# Patient Record
Sex: MALE | Race: WHITE | NOT HISPANIC OR LATINO
[De-identification: names, ages, dates, MRNs, and addresses within clinical notes are randomized per-mention and may not be internally consistent; named-entity substitution may affect disease eponyms.]

---

## 2021-07-23 ENCOUNTER — APPOINTMENT (OUTPATIENT)
Dept: VASCULAR SURGERY | Facility: CLINIC | Age: 81
End: 2021-07-23
Payer: MEDICARE

## 2021-07-23 VITALS
HEART RATE: 49 BPM | BODY MASS INDEX: 29.02 KG/M2 | SYSTOLIC BLOOD PRESSURE: 152 MMHG | WEIGHT: 170 LBS | DIASTOLIC BLOOD PRESSURE: 85 MMHG | HEIGHT: 64 IN

## 2021-07-23 DIAGNOSIS — E78.5 HYPERLIPIDEMIA, UNSPECIFIED: ICD-10-CM

## 2021-07-23 DIAGNOSIS — Z86.39 PERSONAL HISTORY OF OTHER ENDOCRINE, NUTRITIONAL AND METABOLIC DISEASE: ICD-10-CM

## 2021-07-23 DIAGNOSIS — Z86.79 PERSONAL HISTORY OF OTHER DISEASES OF THE CIRCULATORY SYSTEM: ICD-10-CM

## 2021-07-23 DIAGNOSIS — Z85.038 PERSONAL HISTORY OF OTHER MALIGNANT NEOPLASM OF LARGE INTESTINE: ICD-10-CM

## 2021-07-23 DIAGNOSIS — Z87.891 PERSONAL HISTORY OF NICOTINE DEPENDENCE: ICD-10-CM

## 2021-07-23 DIAGNOSIS — I73.9 PERIPHERAL VASCULAR DISEASE, UNSPECIFIED: ICD-10-CM

## 2021-07-23 DIAGNOSIS — Z78.9 OTHER SPECIFIED HEALTH STATUS: ICD-10-CM

## 2021-07-23 PROCEDURE — 99203 OFFICE O/P NEW LOW 30 MIN: CPT | Mod: 25

## 2021-07-23 PROCEDURE — 93923 UPR/LXTR ART STDY 3+ LVLS: CPT

## 2021-07-24 PROBLEM — Z86.79 HISTORY OF HYPERTENSION: Status: RESOLVED | Noted: 2021-07-24 | Resolved: 2021-07-24

## 2021-07-24 PROBLEM — Z86.39 HISTORY OF TYPE 2 DIABETES MELLITUS: Status: RESOLVED | Noted: 2021-07-24 | Resolved: 2021-07-24

## 2021-07-24 PROBLEM — Z87.891 FORMER SMOKER: Status: ACTIVE | Noted: 2021-07-24

## 2021-07-24 PROBLEM — E78.5 DYSLIPIDEMIA: Status: RESOLVED | Noted: 2021-07-24 | Resolved: 2021-07-24

## 2021-07-24 PROBLEM — Z78.9 SOCIAL ALCOHOL USE: Status: ACTIVE | Noted: 2021-07-24

## 2021-07-24 PROBLEM — Z85.038 HISTORY OF MALIGNANT NEOPLASM OF COLON: Status: RESOLVED | Noted: 2021-07-24 | Resolved: 2021-07-24

## 2021-07-27 NOTE — PHYSICAL EXAM
[Normal Breath Sounds] : Normal breath sounds [Varicose Veins Of Lower Extremities] : bilaterally [Ankle Swelling On The Left] : moderate [Alert] : alert [Oriented to Person] : oriented to person [Oriented to Place] : oriented to place [Oriented to Time] : oriented to time [Calm] : calm [Normal Heart Sounds] : normal heart sounds [Normal Rate and Rhythm] : normal rate and rhythm [2+] : left 2+ [0] : right 0 [1+] : left 1+ [JVD] : no jugular venous distention  [de-identified] : Well appearing. NAD [de-identified] : NCAT [de-identified] : FROM

## 2021-07-27 NOTE — ADDENDUM
[FreeTextEntry1] : I, Dr.Vicken Arguello, personally performed the evaluation and management (E/M) services for this new patient.  That E/M includes conducting the initial examination, assessing all conditions, and establishing the plan of care.  Today, my ACP, DAVID Mendoza, was here to observe my evaluation and management services for this patient to be followed going forward.\par \par \par

## 2021-07-27 NOTE — ASSESSMENT
[Arterial/Venous Disease] : arterial/venous disease [FreeTextEntry1] : 79 y/o diabetic M former smoker with PMHx of HTN, HLD, CA and s/p who presents today for initial evaluation of pain in the bilateral legs, R > L. NITHYA/PVR demonstrates R: 0.95 and L: 0.62. This suggests impaired circulation in the left leg and not the right, but patient states pain in the right leg is more severe so I do not believe his symptoms are caused by a circulation problem. I will order a CTA aorta w/run off and refer patient to spine specialist, Dr. White. Patient should follow up in 2 weeks for a carotid scan and CTA review.\par \par Plan\par -Schedule CTA\par -Refer to spine specialist, Dr. White\par -F/u 2 weeks for carotid scan and CTA review

## 2021-07-27 NOTE — HISTORY OF PRESENT ILLNESS
[FreeTextEntry1] : 81 y/o diabetic M, former smoker, with PMHx of HTN, HLD, CA and s/p who presents today for initial evaluation of pain in the bilateral legs. He was referred by Dr. Susan De Leon and his cardiologist is Dr. Alfaro. Patient reports pain in the calves, R > L, after two blocks of walking. He had a stent placed in the RLE in 2020 but reports it did not help his ability to walk long distances without pain. He also reports lower back pain that's been present for over 20 years. Pt states he had his carotid arteries checked ~3-4 years ago.\par \par Patient has a long surgical history including cataract surgery 2002, left shoulder surgery 2002, partial colon resection for colon cancer 2008, hernia repair 2009, trigger finger and carpal tunnel surgery 2011 and 2012, catheterization with LLE stent by Dr. Jaleel Ventura 2012, endovenous laser ablation right leg 2013, endovenous laser ablation left leg 2013, cardiac catheterization by Dr. Tristin Alfaro 2015, aortic valve replacement by Dr. Russ Moe 2015, stent implant in right leg by Dr. Tristin Alfaro 2017, arthroscopy in left knee 2018, trigger finger surgery 2018, pacemaker implantation by Dr. Gideon Piña 2020, cardiac catheterization 2020, stent implant in right leg and dilatation of right femoral artery by Dr. Alfaro 2020. Pt is on Coumadin.\par \par

## 2021-07-27 NOTE — END OF VISIT
[FreeTextEntry3] : This note was written by Pooja Carrasco on 07/23/2021 acting as scribe for Dr. Medina.

## 2021-07-29 ENCOUNTER — APPOINTMENT (OUTPATIENT)
Dept: CT IMAGING | Facility: CLINIC | Age: 81
End: 2021-07-29
Payer: MEDICARE

## 2021-07-29 ENCOUNTER — RESULT REVIEW (OUTPATIENT)
Age: 81
End: 2021-07-29

## 2021-07-29 ENCOUNTER — OUTPATIENT (OUTPATIENT)
Dept: OUTPATIENT SERVICES | Facility: HOSPITAL | Age: 81
LOS: 1 days | End: 2021-07-29

## 2021-07-29 PROCEDURE — 75635 CT ANGIO ABDOMINAL ARTERIES: CPT | Mod: 26,MH

## 2021-08-06 ENCOUNTER — APPOINTMENT (OUTPATIENT)
Dept: VASCULAR SURGERY | Facility: CLINIC | Age: 81
End: 2021-08-06

## 2021-08-16 DIAGNOSIS — Z01.818 ENCOUNTER FOR OTHER PREPROCEDURAL EXAMINATION: ICD-10-CM

## 2021-08-17 ENCOUNTER — RESULT REVIEW (OUTPATIENT)
Age: 81
End: 2021-08-17

## 2021-08-17 ENCOUNTER — APPOINTMENT (OUTPATIENT)
Dept: ORTHOPEDIC SURGERY | Facility: CLINIC | Age: 81
End: 2021-08-17
Payer: MEDICARE

## 2021-08-17 ENCOUNTER — OUTPATIENT (OUTPATIENT)
Dept: OUTPATIENT SERVICES | Facility: HOSPITAL | Age: 81
LOS: 1 days | End: 2021-08-17
Payer: MEDICARE

## 2021-08-17 PROCEDURE — 72082 X-RAY EXAM ENTIRE SPI 2/3 VW: CPT

## 2021-08-17 PROCEDURE — 72100 X-RAY EXAM L-S SPINE 2/3 VWS: CPT

## 2021-08-17 PROCEDURE — 72083 X-RAY EXAM ENTIRE SPI 4/5 VW: CPT

## 2021-08-17 PROCEDURE — 99204 OFFICE O/P NEW MOD 45 MIN: CPT

## 2021-08-17 PROCEDURE — 72084 X-RAY EXAM ENTIRE SPI 6/> VW: CPT | Mod: 26

## 2021-08-17 NOTE — HISTORY OF PRESENT ILLNESS
[de-identified] : Referred by Dr. Arguello\par \par Mr. STANLEY is a very pleasant 80 year old male who complains of bilateral calf pain for several years, atraumatic. On onset, would only get pain when ambulating about 10 blocks, now gets pain after ambulating 1/2 block. Pain resolves with rest. Also with mild/moderate chronic nonradiating low back pain. Calf pain is primary complaint. Per patient, \par \par The patient no history of previous spine surgery.\par \par The patient has no history of unexpected weight loss, no history of active cancer, no history bladder or bowel dysfunction, no night pain, no fevers or chills.\par \par The past medical history, surgical history, family history, allergies, medications, 10+ point review of systems, family history and social history were reviewed and non contributory.\par \par

## 2021-08-17 NOTE — DISCUSSION/SUMMARY
[de-identified] : Discussed my findings with the patient. Mr. Phelps has been suffering from bilateral calf pain with ambulating over the past several months. Now only able to ambulate 1/2 block before stopping. Has pacemaker, is MRI compatible. I am recommending an MRI lumbar without contrast, order given. Follow up with me after imaging is completed, sooner if there is an issue. All questions answered.

## 2021-08-17 NOTE — PHYSICAL EXAM
[de-identified] : General: patient is well developed, well nourished, in no acute \par distress, alert and oriented x 3. \par \par Mood and affect: normal\par \par Respiratory: no respiratory distress noted\par \par Skin: no scars over spine, skin intact, no erythema, increased warmth\par \par Alignment:The spine is well compensated in the coronal and sagittal plane.  \par \par Gait: The patient is able to toe walk and heel walk without difficulty.\par \par Palpation: no tenderness to palpation spine or paraspinal region\par \par Range of motion: Lumbar spine ROM is restricted\par \par Neurologic Exam:\par Motor: Manual Muscle testing in the lower extremities is 5 out of 5 in all muscle groups. There is no evidence of muscular atrophy in the lower extremities. Sensory: Sensation to light touch is grossly intact in the lower extremities\par \par Reflexes: DTR are present and symmetric throughout\par \par Hip Exam: No pain with internal or external rotation of hips bilaterally\par \par Special tests: Straight leg raise test negative.  Cross straight leg test negative.  ERIS test negative\par \par \par  [de-identified] : XR 8/17/21: multilevel degenerative changes with bridging osteophytes, no spondylolisthesis or dynamic instability, no scoliosis, no fractures seen

## 2021-08-23 ENCOUNTER — OUTPATIENT (OUTPATIENT)
Dept: OUTPATIENT SERVICES | Facility: HOSPITAL | Age: 81
LOS: 1 days | End: 2021-08-23
Payer: MEDICARE

## 2021-08-23 DIAGNOSIS — Z01.818 ENCOUNTER FOR OTHER PREPROCEDURAL EXAMINATION: ICD-10-CM

## 2021-08-23 LAB
ALBUMIN SERPL ELPH-MCNC: 4.6 G/DL — SIGNIFICANT CHANGE UP (ref 3.3–5)
ALP SERPL-CCNC: 78 U/L — SIGNIFICANT CHANGE UP (ref 40–120)
ALT FLD-CCNC: 25 U/L — SIGNIFICANT CHANGE UP (ref 10–45)
ANION GAP SERPL CALC-SCNC: 10 MMOL/L — SIGNIFICANT CHANGE UP (ref 5–17)
AST SERPL-CCNC: 26 U/L — SIGNIFICANT CHANGE UP (ref 10–40)
BASOPHILS # BLD AUTO: 0.09 K/UL — SIGNIFICANT CHANGE UP (ref 0–0.2)
BASOPHILS NFR BLD AUTO: 0.8 % — SIGNIFICANT CHANGE UP (ref 0–2)
BILIRUB SERPL-MCNC: 0.4 MG/DL — SIGNIFICANT CHANGE UP (ref 0.2–1.2)
BUN SERPL-MCNC: 14 MG/DL — SIGNIFICANT CHANGE UP (ref 7–23)
CALCIUM SERPL-MCNC: 9.5 MG/DL — SIGNIFICANT CHANGE UP (ref 8.4–10.5)
CHLORIDE SERPL-SCNC: 100 MMOL/L — SIGNIFICANT CHANGE UP (ref 96–108)
CO2 SERPL-SCNC: 30 MMOL/L — SIGNIFICANT CHANGE UP (ref 22–31)
CREAT SERPL-MCNC: 0.91 MG/DL — SIGNIFICANT CHANGE UP (ref 0.5–1.3)
EOSINOPHIL # BLD AUTO: 0.11 K/UL — SIGNIFICANT CHANGE UP (ref 0–0.5)
EOSINOPHIL NFR BLD AUTO: 1 % — SIGNIFICANT CHANGE UP (ref 0–6)
GLUCOSE SERPL-MCNC: 106 MG/DL — HIGH (ref 70–99)
HCT VFR BLD CALC: 46.8 % — SIGNIFICANT CHANGE UP (ref 39–50)
HGB BLD-MCNC: 14.5 G/DL — SIGNIFICANT CHANGE UP (ref 13–17)
IMM GRANULOCYTES NFR BLD AUTO: 0.6 % — SIGNIFICANT CHANGE UP (ref 0–1.5)
INR BLD: 1.04 — SIGNIFICANT CHANGE UP (ref 0.88–1.16)
LYMPHOCYTES # BLD AUTO: 2.31 K/UL — SIGNIFICANT CHANGE UP (ref 1–3.3)
LYMPHOCYTES # BLD AUTO: 21.1 % — SIGNIFICANT CHANGE UP (ref 13–44)
MCHC RBC-ENTMCNC: 27.3 PG — SIGNIFICANT CHANGE UP (ref 27–34)
MCHC RBC-ENTMCNC: 31 GM/DL — LOW (ref 32–36)
MCV RBC AUTO: 88 FL — SIGNIFICANT CHANGE UP (ref 80–100)
MONOCYTES # BLD AUTO: 0.82 K/UL — SIGNIFICANT CHANGE UP (ref 0–0.9)
MONOCYTES NFR BLD AUTO: 7.5 % — SIGNIFICANT CHANGE UP (ref 2–14)
NEUTROPHILS # BLD AUTO: 7.56 K/UL — HIGH (ref 1.8–7.4)
NEUTROPHILS NFR BLD AUTO: 69 % — SIGNIFICANT CHANGE UP (ref 43–77)
NRBC # BLD: 0 /100 WBCS — SIGNIFICANT CHANGE UP (ref 0–0)
PLATELET # BLD AUTO: 192 K/UL — SIGNIFICANT CHANGE UP (ref 150–400)
POTASSIUM SERPL-MCNC: 5.5 MMOL/L — HIGH (ref 3.5–5.3)
POTASSIUM SERPL-SCNC: 5.5 MMOL/L — HIGH (ref 3.5–5.3)
PROT SERPL-MCNC: 6.8 G/DL — SIGNIFICANT CHANGE UP (ref 6–8.3)
PROTHROM AB SERPL-ACNC: 12.5 SEC — SIGNIFICANT CHANGE UP (ref 10.6–13.6)
RBC # BLD: 5.32 M/UL — SIGNIFICANT CHANGE UP (ref 4.2–5.8)
RBC # FLD: 13.8 % — SIGNIFICANT CHANGE UP (ref 10.3–14.5)
SODIUM SERPL-SCNC: 140 MMOL/L — SIGNIFICANT CHANGE UP (ref 135–145)
WBC # BLD: 10.96 K/UL — HIGH (ref 3.8–10.5)
WBC # FLD AUTO: 10.96 K/UL — HIGH (ref 3.8–10.5)

## 2021-08-23 PROCEDURE — 85610 PROTHROMBIN TIME: CPT

## 2021-08-23 PROCEDURE — 93010 ELECTROCARDIOGRAM REPORT: CPT

## 2021-08-23 PROCEDURE — 85025 COMPLETE CBC W/AUTO DIFF WBC: CPT

## 2021-08-23 PROCEDURE — 80053 COMPREHEN METABOLIC PANEL: CPT

## 2021-08-23 PROCEDURE — 93005 ELECTROCARDIOGRAM TRACING: CPT

## 2021-08-25 ENCOUNTER — TRANSCRIPTION ENCOUNTER (OUTPATIENT)
Age: 81
End: 2021-08-25

## 2021-08-25 VITALS
WEIGHT: 177.25 LBS | DIASTOLIC BLOOD PRESSURE: 68 MMHG | RESPIRATION RATE: 16 BRPM | OXYGEN SATURATION: 97 % | TEMPERATURE: 98 F | SYSTOLIC BLOOD PRESSURE: 176 MMHG | HEART RATE: 60 BPM

## 2021-08-25 RX ORDER — FUROSEMIDE 40 MG
1 TABLET ORAL
Qty: 0 | Refills: 0 | DISCHARGE

## 2021-08-25 RX ORDER — POTASSIUM CHLORIDE 20 MEQ
0 PACKET (EA) ORAL
Qty: 0 | Refills: 0 | DISCHARGE

## 2021-08-25 NOTE — ASU PATIENT PROFILE, ADULT - NSICDXPASTMEDICALHX_GEN_ALL_CORE_FT
PAST MEDICAL HISTORY:  Abdominal hernia Hernia    Cataract Cataract    Essential hypertension Hypertension    Hyperlipidemia Dyslipidemia    Malignant neoplasm of colon Colon cancer    Peripheral vascular disease s/p LLE stent    Type 2 diabetes mellitus Diabetes    Varicose veins without complication Varicose veins

## 2021-08-25 NOTE — ASU PATIENT PROFILE, ADULT - NSICDXPASTSURGICALHX_GEN_ALL_CORE_FT
PAST SURGICAL HISTORY:  Abdominal hernia Hernia    Acquired trigger finger Trigger finger of both hands    Carpal tunnel syndrome Carpal tunnel syndrome, bilateral    Cataract Cataracts, bilateral    Other postprocedural status S/P colon resection    Varicose veins without complication Varicose veins

## 2021-08-26 ENCOUNTER — TRANSCRIPTION ENCOUNTER (OUTPATIENT)
Age: 81
End: 2021-08-26

## 2021-08-26 ENCOUNTER — APPOINTMENT (OUTPATIENT)
Dept: VASCULAR SURGERY | Facility: HOSPITAL | Age: 81
End: 2021-08-26

## 2021-08-26 ENCOUNTER — OUTPATIENT (OUTPATIENT)
Dept: OUTPATIENT SERVICES | Facility: HOSPITAL | Age: 81
LOS: 1 days | Discharge: HOME | End: 2021-08-26
Payer: MEDICARE

## 2021-08-26 VITALS — OXYGEN SATURATION: 96 % | RESPIRATION RATE: 18 BRPM | HEART RATE: 60 BPM

## 2021-08-26 DIAGNOSIS — I73.9 PERIPHERAL VASCULAR DISEASE, UNSPECIFIED: ICD-10-CM

## 2021-08-26 LAB
GLUCOSE BLDC GLUCOMTR-MCNC: 101 MG/DL — HIGH (ref 70–99)
GLUCOSE BLDC GLUCOMTR-MCNC: 145 MG/DL — HIGH (ref 70–99)

## 2021-08-26 PROCEDURE — 37226: CPT | Mod: RT

## 2021-08-26 PROCEDURE — C1769: CPT

## 2021-08-26 PROCEDURE — 37226: CPT | Mod: GC

## 2021-08-26 PROCEDURE — C1887: CPT

## 2021-08-26 PROCEDURE — C1725: CPT

## 2021-08-26 PROCEDURE — C1894: CPT

## 2021-08-26 PROCEDURE — 82962 GLUCOSE BLOOD TEST: CPT

## 2021-08-26 PROCEDURE — C1874: CPT

## 2021-08-26 RX ORDER — METOPROLOL TARTRATE 50 MG
5 TABLET ORAL ONCE
Refills: 0 | Status: COMPLETED | OUTPATIENT
Start: 2021-08-26 | End: 2021-08-26

## 2021-08-26 RX ORDER — SODIUM CHLORIDE 9 MG/ML
0 INJECTION INTRAMUSCULAR; INTRAVENOUS; SUBCUTANEOUS
Qty: 0 | Refills: 0 | DISCHARGE
Start: 2021-08-26

## 2021-08-26 RX ORDER — ASPIRIN/CALCIUM CARB/MAGNESIUM 324 MG
81 TABLET ORAL DAILY
Refills: 0 | Status: DISCONTINUED | OUTPATIENT
Start: 2021-08-26 | End: 2021-08-26

## 2021-08-26 RX ORDER — HYDRALAZINE HCL 50 MG
5 TABLET ORAL ONCE
Refills: 0 | Status: COMPLETED | OUTPATIENT
Start: 2021-08-26 | End: 2021-08-26

## 2021-08-26 RX ORDER — SODIUM CHLORIDE 9 MG/ML
1000 INJECTION INTRAMUSCULAR; INTRAVENOUS; SUBCUTANEOUS
Refills: 0 | Status: DISCONTINUED | OUTPATIENT
Start: 2021-08-26 | End: 2021-08-26

## 2021-08-26 RX ADMIN — Medication 5 MILLIGRAM(S): at 15:53

## 2021-08-26 RX ADMIN — SODIUM CHLORIDE 60 MILLILITER(S): 9 INJECTION INTRAMUSCULAR; INTRAVENOUS; SUBCUTANEOUS at 14:27

## 2021-08-26 RX ADMIN — Medication 5 MILLIGRAM(S): at 15:37

## 2021-08-26 RX ADMIN — Medication 81 MILLIGRAM(S): at 14:26

## 2021-08-26 NOTE — DISCHARGE NOTE NURSING/CASE MANAGEMENT/SOCIAL WORK - NSDPLANG ASIS_GEN_ALL_CORE
Patient does not need to use any Advair since he is taking Symbicort. Please also continue Spiriva.  
Patient uses [facility] pharmacy . Hydrocodone with acetaminophen   
No

## 2021-08-26 NOTE — DISCHARGE NOTE NURSING/CASE MANAGEMENT/SOCIAL WORK - PATIENT PORTAL LINK FT
You can access the FollowMyHealth Patient Portal offered by [facility] by registering at the following website: [email or web address]. By joining [facility]’s FollowMyHealth portal, you will also be able to view your health information using other applications (apps) compatible with our system.

## 2021-08-26 NOTE — DISCHARGE NOTE PROVIDER - HOSPITAL COURSE
80y male admitted for elective RLE angiogram - significant stenosis found in proximal SFA and instent restenosis - stenoses treated with Zilver PTX 7x60mm stent.  this was done through left groin 6F access - hemostasis was achieved with 20 minutes of manual pressure. Patient received plavix in the PACU and he was discharged home after 4 hours of bedrest after the sheath was removed.

## 2021-08-26 NOTE — DISCHARGE NOTE PROVIDER - NSDCMRMEDTOKEN_GEN_ALL_CORE_FT
aspirin 81 mg oral tablet:   Lipitor 80 mg oral tablet: 1 tab(s) orally once a day  losartan 100 mg oral tablet: 1 tab(s) orally once a day  metFORMIN 1000 mg oral tablet: 1 tab(s) orally 2 times a day  metoprolol tartrate 50 mg oral tablet: 1 tab(s) orally 2 times a day  Plavix 75 mg oral tablet: 1 tab(s) orally once a day  rosuvastatin 10 mg oral tablet: 1 tab(s) orally once a day  Vitamin B12 1000 mcg oral tablet: 1 tab(s) orally once a day   aspirin 81 mg oral tablet:   Lipitor 80 mg oral tablet: 1 tab(s) orally once a day  losartan 100 mg oral tablet: 1 tab(s) orally once a day  metFORMIN 1000 mg oral tablet: 1 tab(s) orally 2 times a day  metoprolol tartrate 50 mg oral tablet: 1 tab(s) orally 2 times a day  Plavix 75 mg oral tablet: 1 tab(s) orally once a day  rosuvastatin 10 mg oral tablet: 1 tab(s) orally once a day  sodium chloride 0.9% injectable solution:  injectable   Vitamin B12 1000 mcg oral tablet: 1 tab(s) orally once a day

## 2021-08-26 NOTE — DISCHARGE NOTE PROVIDER - NSDCFUSCHEDAPPT_GEN_ALL_CORE_FT
ANNE STANLEY ; 08/31/2021 ; [facility] PreAdmits  ANNE STANLEY ; 08/31/2021 ; [facility] Rad MRI  [address]   ANNE STANLEY ; 09/10/2021 ; [facility] Surg Vasc [address]  ANNE STANLEY ; 09/14/2021 ; [facility] OrthoSurg [address]

## 2021-08-26 NOTE — DISCHARGE NOTE PROVIDER - NSDCACTIVITY_GEN_ALL_CORE
Return to Work/School allowed/Do not drive or operate machinery/Do not make important decisions/No heavy lifting/straining

## 2021-08-26 NOTE — DISCHARGE NOTE PROVIDER - NSDCCPCAREPLAN_GEN_ALL_CORE_FT
PRINCIPAL DISCHARGE DIAGNOSIS  Diagnosis: PVD (peripheral vascular disease)  Assessment and Plan of Treatment:       SECONDARY DISCHARGE DIAGNOSES  Diagnosis: PVD (peripheral vascular disease)  Assessment and Plan of Treatment:

## 2021-08-26 NOTE — PROGRESS NOTE ADULT - SUBJECTIVE AND OBJECTIVE BOX
Vascular Surgery Post-Op Note    Procedure:    Diagnosis/Indication: Claudication    Surgeon: Dr. Arguello    S: Pt has no complaints. Denies CP, SOB, CALABRESE, calf tenderness. Pain controlled with medication.    O:  T(C): --  T(F): --  HR: 59 (08-26-21 @ 16:25) (57 - 61)  BP: 164/72 (08-26-21 @ 16:25) (163/77 - 209/92)  RR: 17 (08-26-21 @ 16:25) (13 - 28)  SpO2: 97% (08-26-21 @ 16:25) (95% - 100%)  Wt(kg): --            Gen: NAD, resting comfortably in bed  C/V: NSR  Pulm: Nonlabored breathing, no respiratory distress  Abd: soft, NT/ND  Extrem: WWP, no calf edema, SCDs in place      A/P: 80yMale s/p above procedure  Diet:  IVF:  Pain/nausea control  DVT ppx:  Dispo plan: Vascular Surgery Post-Op Note    Procedure: RLE angiogram and SFA stent    Diagnosis/Indication: Claudication    Surgeon: Dr. Arguello    S: Pt has no complaints. Pain controlled with medication.    O:  T(C): --  T(F): --  HR: 59 (08-26-21 @ 16:25) (57 - 61)  BP: 164/72 (08-26-21 @ 16:25) (163/77 - 209/92)  RR: 17 (08-26-21 @ 16:25) (13 - 28)  SpO2: 97% (08-26-21 @ 16:25) (95% - 100%)  Wt(kg): --            Gen: NAD, resting comfortably in bed  C/V: NSR  Pulm: Nonlabored breathing, no respiratory distress  Abd: soft, NT/ND  Extrem: WWP, no calf edema, SCDs in place      A/P: 80y Male s/p above procedure  Diet: regular diet as tolerated   IVF: NS @  Pain/nausea control  DVT ppx:  Dispo plan: Vascular Surgery Post-Op Note    Procedure: RLE angiogram and SFA stent    Diagnosis/Indication: Claudication    Surgeon: Dr. Arguello    S: Pt has no complaints. Pain controlled with medication.    O:  T(C): --  T(F): --  HR: 59 (08-26-21 @ 16:25) (57 - 61)  BP: 164/72 (08-26-21 @ 16:25) (163/77 - 209/92)  RR: 17 (08-26-21 @ 16:25) (13 - 28)  SpO2: 97% (08-26-21 @ 16:25) (95% - 100%)  Wt(kg): --          Physical exam  Gen: NAD, resting comfortably in bed  C/V: NSR  Pulm: Nonlabored breathing, no respiratory distress  Abd: soft, NT/ND  Extrem: 6 [place] sheath from left groin pulled and hemostasis achieved with 20 minutes of manual pressure, left groin soft; palpable DP on right, R biphasic PT      A/P: 80y Male s/p above procedure  Diet: regular diet as tolerated   IVF: NS @ 60 ml/hr  Dispo plan: Home after 4 hrs of being flat after sheath pull

## 2021-08-26 NOTE — BRIEF OPERATIVE NOTE - OPERATION/FINDINGS
Procedure: RLE angiogram via left groin 6F sheath access. SFA instent restenosis and significant stenosis noted at proximal SFA with AT runoff to the foot. Stenosis treated with Zilver 7x60mm.   - palpable DP at conclusion of the case.   Indication: claudication   IV Contrast: 45cc

## 2021-08-26 NOTE — DISCHARGE NOTE PROVIDER - CARE PROVIDER_API CALL
Deann Arguello)  Surgery; Vascular Surgery  [address] [place] [address]  Phone: [phone]  Fax: [phone]  Follow Up Time:

## 2021-08-27 NOTE — PACU DISCHARGE NOTE - COMMENTS
Addendum:  Voice message left for pt regarding printed out discharge paperwork.  documents emailed 8/27/21 as per pt's request on 8/26/21. Spoke with pt's sister Kayla  this am regarding same. As per Kayla neither her or pt  will be able to come to hospital today to retrieve hard copy and sign receipt/pt acknowledgment. no other concerned voiced by family
pt cleared by Vascular and Anesthesia for discharge home. Unable to print-out instructions. verbal post- care instructions given including calling to make follow-up appointment. pt informed that RN will attempt to print instructions on 8/27/21, same pt agreed to have care-giver retrieve documents or if possible email them to [email or web address].

## 2021-08-31 ENCOUNTER — OUTPATIENT (OUTPATIENT)
Dept: OUTPATIENT SERVICES | Facility: HOSPITAL | Age: 81
LOS: 1 days | End: 2021-08-31
Payer: MEDICARE

## 2021-08-31 ENCOUNTER — APPOINTMENT (OUTPATIENT)
Dept: MRI IMAGING | Facility: HOSPITAL | Age: 81
End: 2021-08-31

## 2021-08-31 PROCEDURE — 76000 FLUOROSCOPY <1 HR PHYS/QHP: CPT

## 2021-08-31 PROCEDURE — 72148 MRI LUMBAR SPINE W/O DYE: CPT | Mod: 26,MH

## 2021-08-31 PROCEDURE — 72148 MRI LUMBAR SPINE W/O DYE: CPT

## 2021-09-10 ENCOUNTER — APPOINTMENT (OUTPATIENT)
Dept: VASCULAR SURGERY | Facility: CLINIC | Age: 81
End: 2021-09-10
Payer: MEDICARE

## 2021-09-10 VITALS — HEART RATE: 60 BPM | SYSTOLIC BLOOD PRESSURE: 182 MMHG | DIASTOLIC BLOOD PRESSURE: 75 MMHG

## 2021-09-10 PROCEDURE — 93926 LOWER EXTREMITY STUDY: CPT

## 2021-09-10 PROCEDURE — 99213 OFFICE O/P EST LOW 20 MIN: CPT

## 2021-09-10 NOTE — HISTORY OF PRESENT ILLNESS
[FreeTextEntry1] : 79 y/o diabetic M, former smoker, with PMHx of HTN, HLD, CA and s/p who presents today for evaluation of pain in the bilateral legs. He was referred by Dr. Susan De Leon and his cardiologist is Dr. Alfaro. Patient reports pain in the calves, now L >R after two blocks of walking. He had a stent placed in the RLE in 2020 but reports it did not help his ability to walk long distances without pain. He also reports lower back pain that's been present for over 20 years. Pt states he had his carotid arteries checked ~3-4 years ago.\par \par \par Off note, patient has a long surgical history including cataract surgery 2002, left shoulder surgery 2002, partial colon resection for colon cancer 2008, hernia repair 2009, trigger finger and carpal tunnel surgery 2011 and 2012, catheterization with LLE stent by Dr. Jaleel Ventura 2012, endovenous laser ablation right leg 2013, endovenous laser ablation left leg 2013, cardiac catheterization by Dr. Tristin Alfaro 2015, aortic valve replacement by Dr. Russ Moe 2015, stent implant in right leg by Dr. Tristin Alfaro 2017, arthroscopy in left knee 2018, trigger finger surgery 2018, pacemaker implantation by Dr. Gideon Piña 2020, cardiac catheterization 2020, stent implant in right leg and dilatation of right femoral artery by Dr. Alfaro 2020. Pt is on Coumadin.\par \par

## 2021-09-10 NOTE — ADDENDUM
[FreeTextEntry1] : This note was written by Celia James on 09/10/2021 acting as scribe for Deann Foy M.D.\par \par

## 2021-09-10 NOTE — ASSESSMENT
[Arterial/Venous Disease] : arterial/venous disease [Medication Management] : medication management [FreeTextEntry1] : 81 y/o diabetic M former smoker with PMHx of HTN, HLD, CA and s/p who presents today for initial evaluation of pain in the bilateral legs, R > L s/p RLE SFA stent presents with complaints of LLE 1-2 blocks claudication. RLE arterial studies reveal: diffuse fibrocalcific plaque noted in the femoropopliteal tibial arteries. Stents visualized in the SFA is patent. Given severity of LLE claudication pt recommended for LLE angiogram with possible angioplasty/stent. Patient consents and would like to move forward with discussed course of treatment.

## 2021-09-10 NOTE — PHYSICAL EXAM
[Normal Breath Sounds] : Normal breath sounds [Normal Heart Sounds] : normal heart sounds [Normal Rate and Rhythm] : normal rate and rhythm [2+] : left 2+ [0] : right 0 [1+] : left 1+ [Varicose Veins Of Lower Extremities] : present [Ankle Swelling On The Left] : moderate [Alert] : alert [Oriented to Person] : oriented to person [Oriented to Place] : oriented to place [Oriented to Time] : oriented to time [Calm] : calm [JVD] : no jugular venous distention  [de-identified] : Well appearing. NAD [de-identified] : NCAT [de-identified] : FROM

## 2021-09-14 ENCOUNTER — APPOINTMENT (OUTPATIENT)
Dept: ORTHOPEDIC SURGERY | Facility: CLINIC | Age: 81
End: 2021-09-14
Payer: MEDICARE

## 2021-09-14 DIAGNOSIS — M48.061 SPINAL STENOSIS, LUMBAR REGION WITHOUT NEUROGENIC CLAUDICATION: ICD-10-CM

## 2021-09-14 DIAGNOSIS — M79.604 PAIN IN RIGHT LEG: ICD-10-CM

## 2021-09-14 DIAGNOSIS — M79.605 PAIN IN RIGHT LEG: ICD-10-CM

## 2021-09-14 PROCEDURE — 99214 OFFICE O/P EST MOD 30 MIN: CPT

## 2021-09-14 RX ORDER — CLOPIDOGREL BISULFATE 75 MG/1
75 TABLET, FILM COATED ORAL
Qty: 90 | Refills: 0 | Status: ACTIVE | COMMUNITY
Start: 2021-06-01

## 2021-09-14 RX ORDER — EZETIMIBE 10 MG/1
10 TABLET ORAL
Qty: 90 | Refills: 0 | Status: ACTIVE | COMMUNITY
Start: 2021-07-30

## 2021-09-14 RX ORDER — METOPROLOL SUCCINATE 50 MG/1
50 TABLET, EXTENDED RELEASE ORAL
Qty: 90 | Refills: 0 | Status: ACTIVE | COMMUNITY
Start: 2021-06-01

## 2021-09-14 RX ORDER — ROSUVASTATIN CALCIUM 40 MG/1
40 TABLET, FILM COATED ORAL
Qty: 90 | Refills: 0 | Status: ACTIVE | COMMUNITY
Start: 2021-09-08

## 2021-09-17 NOTE — HISTORY OF PRESENT ILLNESS
[de-identified] : Follow up 9/14/21: Patient reports improvement in right calf pain s/p RLE angioplasty and stent of SFA 2 weeks ago with Dr. Arguello. Still with left calf pain, plan for LLE angiogram and possible angioplasty/stent. Denies new neurologic symptoms. Here to review updated imaging. \par \par Initial 8/2021: Mr. STANLEY is a very pleasant 80 year old male who complains of bilateral calf pain for several years, atraumatic. On onset, would only get pain when ambulating about 10 blocks, now gets pain after ambulating 1/2 block. Pain resolves with rest. Also with mild/moderate chronic nonradiating low back pain. Calf pain is primary complaint. Per patient, \par \par The patient no history of previous spine surgery.\par \par The patient has no history of unexpected weight loss, no history of active cancer, no history bladder or bowel dysfunction, no night pain, no fevers or chills.\par \par The past medical history, surgical history, family history, allergies, medications, 10+ point review of systems, family history and social history were reviewed and non contributory.\par \par

## 2021-09-17 NOTE — PHYSICAL EXAM
[de-identified] : General: patient is well developed, well nourished, in no acute \par distress, alert and oriented x 3. \par \par Mood and affect: normal\par \par Respiratory: no respiratory distress noted\par \par Skin: no scars over spine, skin intact, no erythema, increased warmth\par \par Alignment:The spine is well compensated in the coronal and sagittal plane.  \par \par Gait: The patient is able to toe walk and heel walk without difficulty.\par \par Palpation: no tenderness to palpation spine or paraspinal region\par \par Range of motion: Lumbar spine ROM is restricted\par \par Neurologic Exam:\par Motor: Manual Muscle testing in the lower extremities is 5 out of 5 in all muscle groups. There is no evidence of muscular atrophy in the lower extremities. Sensory: Sensation to light touch is grossly intact in the lower extremities\par \par Reflexes: DTR are present and symmetric throughout\par \par Hip Exam: No pain with internal or external rotation of hips bilaterally\par \par Special tests: Straight leg raise test negative.  Cross straight leg test negative.  ERIS test negative\par \par \par  [de-identified] : MRI lumbar 8/2021: multilevel disc bulges and posterior element hypertrophy; L4/L5 moderate bilateral foraminal stenosis

## 2021-09-17 NOTE — DISCUSSION/SUMMARY
[de-identified] : Discussed my findings with the patient. Patient reports improvement in right calf pain s/p R SFA angioplasty/stent with Dr. Arguello 2 weeks ago. As symptoms improved post-procedure, recommending he proceed with LLE angiogram as planned. He will follow up with me approximately 6-8 weeks s/p angiogram if still having pain, sooner if there is an issue. All questions answered.

## 2021-09-27 ENCOUNTER — TRANSCRIPTION ENCOUNTER (OUTPATIENT)
Age: 81
End: 2021-09-27

## 2021-09-27 VITALS
OXYGEN SATURATION: 96 % | HEART RATE: 63 BPM | RESPIRATION RATE: 16 BRPM | WEIGHT: 177.03 LBS | TEMPERATURE: 98 F | SYSTOLIC BLOOD PRESSURE: 178 MMHG | HEIGHT: 65 IN | DIASTOLIC BLOOD PRESSURE: 70 MMHG

## 2021-09-27 NOTE — ASU PATIENT PROFILE, ADULT - NSICDXPASTSURGICALHX_GEN_ALL_CORE_FT
PAST SURGICAL HISTORY:  Abdominal hernia Hernia    Acquired trigger finger Trigger finger of both hands    Carpal tunnel syndrome Carpal tunnel syndrome, bilateral    Cataract Cataracts, bilateral    Other postprocedural status S/P colon resection    Varicose veins without complication Varicose veins     PAST SURGICAL HISTORY:  Abdominal hernia Hernia    Acquired trigger finger Trigger finger of both hands    Carpal tunnel syndrome Carpal tunnel syndrome, bilateral    Cataract Cataracts, bilateral    Other postprocedural status S/P colon resection    S/P angiogram of extremity s/p RLE Angio/SFA PTA stent    Varicose veins without complication Varicose veins     PAST SURGICAL HISTORY:  Abdominal hernia Hernia    Acquired trigger finger Trigger finger of both hands    Cardiac pacemaker     Carpal tunnel syndrome Carpal tunnel syndrome, bilateral    Cataract Cataracts, bilateral    Other postprocedural status S/P colon resection    S/P angiogram of extremity s/p RLE Angio/SFA PTA stent    S/P TAVR (transcatheter aortic valve replacement) 6 years ago    Varicose veins without complication Varicose veins

## 2021-09-28 ENCOUNTER — APPOINTMENT (OUTPATIENT)
Dept: VASCULAR SURGERY | Facility: HOSPITAL | Age: 81
End: 2021-09-28

## 2021-09-28 ENCOUNTER — OUTPATIENT (OUTPATIENT)
Dept: INPATIENT UNIT | Facility: HOSPITAL | Age: 81
LOS: 1 days | Discharge: HOME | End: 2021-09-28
Payer: MEDICARE

## 2021-09-28 ENCOUNTER — TRANSCRIPTION ENCOUNTER (OUTPATIENT)
Age: 81
End: 2021-09-28

## 2021-09-28 VITALS
SYSTOLIC BLOOD PRESSURE: 173 MMHG | DIASTOLIC BLOOD PRESSURE: 79 MMHG | OXYGEN SATURATION: 98 % | HEART RATE: 73 BPM | RESPIRATION RATE: 15 BRPM

## 2021-09-28 DIAGNOSIS — Z95.0 PRESENCE OF CARDIAC PACEMAKER: Chronic | ICD-10-CM

## 2021-09-28 DIAGNOSIS — Z95.2 PRESENCE OF PROSTHETIC HEART VALVE: Chronic | ICD-10-CM

## 2021-09-28 DIAGNOSIS — Z98.890 OTHER SPECIFIED POSTPROCEDURAL STATES: Chronic | ICD-10-CM

## 2021-09-28 LAB
GLUCOSE BLDC GLUCOMTR-MCNC: 114 MG/DL — HIGH (ref 70–99)
GLUCOSE BLDC GLUCOMTR-MCNC: 131 MG/DL — HIGH (ref 70–99)

## 2021-09-28 PROCEDURE — 82962 GLUCOSE BLOOD TEST: CPT

## 2021-09-28 PROCEDURE — C1725: CPT

## 2021-09-28 PROCEDURE — C1769: CPT

## 2021-09-28 PROCEDURE — 37226: CPT | Mod: LT

## 2021-09-28 PROCEDURE — C1894: CPT

## 2021-09-28 PROCEDURE — C1887: CPT

## 2021-09-28 PROCEDURE — C1874: CPT

## 2021-09-28 PROCEDURE — 37226: CPT | Mod: GC

## 2021-09-28 PROCEDURE — 76000 FLUOROSCOPY <1 HR PHYS/QHP: CPT

## 2021-09-28 RX ORDER — EZETIMIBE 10 MG/1
1 TABLET ORAL
Qty: 0 | Refills: 0 | DISCHARGE

## 2021-09-28 RX ORDER — METOPROLOL TARTRATE 50 MG
1 TABLET ORAL
Qty: 0 | Refills: 0 | DISCHARGE

## 2021-09-28 RX ORDER — DEXTROSE 50 % IN WATER 50 %
25 SYRINGE (ML) INTRAVENOUS ONCE
Refills: 0 | Status: DISCONTINUED | OUTPATIENT
Start: 2021-09-28 | End: 2021-09-28

## 2021-09-28 RX ORDER — ASPIRIN/CALCIUM CARB/MAGNESIUM 324 MG
0 TABLET ORAL
Qty: 0 | Refills: 0 | DISCHARGE

## 2021-09-28 RX ORDER — AMLODIPINE BESYLATE 2.5 MG/1
1 TABLET ORAL
Qty: 0 | Refills: 0 | DISCHARGE

## 2021-09-28 RX ORDER — ASPIRIN/CALCIUM CARB/MAGNESIUM 324 MG
81 TABLET ORAL DAILY
Refills: 0 | Status: DISCONTINUED | OUTPATIENT
Start: 2021-09-28 | End: 2021-09-28

## 2021-09-28 RX ORDER — DEXTROSE 50 % IN WATER 50 %
15 SYRINGE (ML) INTRAVENOUS ONCE
Refills: 0 | Status: DISCONTINUED | OUTPATIENT
Start: 2021-09-28 | End: 2021-09-28

## 2021-09-28 RX ORDER — LOSARTAN POTASSIUM 100 MG/1
1 TABLET, FILM COATED ORAL
Qty: 0 | Refills: 0 | DISCHARGE

## 2021-09-28 RX ORDER — ATORVASTATIN CALCIUM 80 MG/1
1 TABLET, FILM COATED ORAL
Qty: 0 | Refills: 0 | DISCHARGE

## 2021-09-28 RX ORDER — CLOPIDOGREL BISULFATE 75 MG/1
1 TABLET, FILM COATED ORAL
Qty: 0 | Refills: 0 | DISCHARGE

## 2021-09-28 RX ORDER — PREGABALIN 225 MG/1
1 CAPSULE ORAL
Qty: 0 | Refills: 0 | DISCHARGE

## 2021-09-28 RX ORDER — SODIUM CHLORIDE 9 MG/ML
1000 INJECTION, SOLUTION INTRAVENOUS
Refills: 0 | Status: DISCONTINUED | OUTPATIENT
Start: 2021-09-28 | End: 2021-09-28

## 2021-09-28 RX ORDER — GLUCAGON INJECTION, SOLUTION 0.5 MG/.1ML
1 INJECTION, SOLUTION SUBCUTANEOUS ONCE
Refills: 0 | Status: DISCONTINUED | OUTPATIENT
Start: 2021-09-28 | End: 2021-09-28

## 2021-09-28 RX ORDER — ATORVASTATIN CALCIUM 80 MG/1
80 TABLET, FILM COATED ORAL AT BEDTIME
Refills: 0 | Status: DISCONTINUED | OUTPATIENT
Start: 2021-09-28 | End: 2021-09-28

## 2021-09-28 RX ORDER — CLOPIDOGREL BISULFATE 75 MG/1
75 TABLET, FILM COATED ORAL EVERY 24 HOURS
Refills: 0 | Status: DISCONTINUED | OUTPATIENT
Start: 2021-09-28 | End: 2021-09-28

## 2021-09-28 RX ORDER — DORZOLAMIDE HYDROCHLORIDE 20 MG/ML
1 SOLUTION/ DROPS OPHTHALMIC
Qty: 0 | Refills: 0 | DISCHARGE

## 2021-09-28 RX ORDER — INSULIN LISPRO 100/ML
VIAL (ML) SUBCUTANEOUS
Refills: 0 | Status: DISCONTINUED | OUTPATIENT
Start: 2021-09-28 | End: 2021-09-28

## 2021-09-28 RX ORDER — METFORMIN HYDROCHLORIDE 850 MG/1
1 TABLET ORAL
Qty: 0 | Refills: 0 | DISCHARGE

## 2021-09-28 RX ORDER — DEXTROSE 50 % IN WATER 50 %
12.5 SYRINGE (ML) INTRAVENOUS ONCE
Refills: 0 | Status: DISCONTINUED | OUTPATIENT
Start: 2021-09-28 | End: 2021-09-28

## 2021-09-28 RX ORDER — ROSUVASTATIN CALCIUM 5 MG/1
1 TABLET ORAL
Qty: 0 | Refills: 0 | DISCHARGE

## 2021-09-28 RX ORDER — TRAVOPROST 0.04 MG/ML
1 SOLUTION/ DROPS OPHTHALMIC
Qty: 0 | Refills: 0 | DISCHARGE

## 2021-09-28 RX ORDER — METOPROLOL TARTRATE 50 MG
50 TABLET ORAL EVERY 12 HOURS
Refills: 0 | Status: DISCONTINUED | OUTPATIENT
Start: 2021-09-28 | End: 2021-09-28

## 2021-09-28 RX ADMIN — Medication 81 MILLIGRAM(S): at 13:46

## 2021-09-28 RX ADMIN — CLOPIDOGREL BISULFATE 75 MILLIGRAM(S): 75 TABLET, FILM COATED ORAL at 13:46

## 2021-09-28 NOTE — DISCHARGE NOTE NURSING/CASE MANAGEMENT/SOCIAL WORK - NSDCPEFALRISK_GEN_ALL_CORE
For information on Fall & injury Prevention, visit [email or web address]

## 2021-09-28 NOTE — CHART NOTE - NSCHARTNOTEFT_GEN_A_CORE
s/p LLE angiogram and stenting. Post Op patient is resting comfortably. Denies abdominal pain, back pain. Denies right groin swelling or bleeding. Denies bilateral foot numbness or weakness.   -Right groin soft, no hematoma or ecchymosis noted   -ASA+Plavix given in PACU   -VSS stable   -bedrest x4 hrs   -d/c home with after bedrest

## 2021-09-28 NOTE — ASU DISCHARGE PLAN (ADULT/PEDIATRIC) - CARE PROVIDER_API CALL
Deann Arguello)  Surgery; Vascular Surgery  [address] [place] [address]  Phone: [phone]  Fax: [phone]  Follow Up Time:

## 2021-09-28 NOTE — PACU DISCHARGE NOTE - COMMENTS
s/p LLE angiogram and SFA stent replacement through Rt groin site dressed with pressure gauze and tegaderm. Rt groin site is clean and no hematoma. Both DPs are (+) doppler.  Vital Signs Stable. Pt is A&Ox4 and fully functional . Pt denies pain or discomfort at this time. No complaints nausea and vomiting. Discharge instruction reviewed with patient, patient verbalized and complete understand and escorted to lobby.

## 2021-09-28 NOTE — DISCHARGE NOTE NURSING/CASE MANAGEMENT/SOCIAL WORK - PATIENT PORTAL LINK FT
You can access the FollowMyHealth Patient Portal offered by [facility] by registering at the following website: [email or web address]. By joining [employer]’s FollowMyHealth portal, you will also be able to view your health information using other applications (apps) compatible with our system.

## 2021-10-04 ENCOUNTER — APPOINTMENT (OUTPATIENT)
Dept: VASCULAR SURGERY | Facility: CLINIC | Age: 81
End: 2021-10-04
Payer: MEDICARE

## 2021-10-04 VITALS
DIASTOLIC BLOOD PRESSURE: 77 MMHG | SYSTOLIC BLOOD PRESSURE: 155 MMHG | HEART RATE: 79 BPM | HEIGHT: 64 IN | BODY MASS INDEX: 29.02 KG/M2 | WEIGHT: 170 LBS

## 2021-10-04 PROCEDURE — 99213 OFFICE O/P EST LOW 20 MIN: CPT

## 2021-10-04 PROCEDURE — 93926 LOWER EXTREMITY STUDY: CPT

## 2021-10-08 NOTE — ASSESSMENT
[Arterial/Venous Disease] : arterial/venous disease [FreeTextEntry1] : 81 y/o diabetic M former smoker with PMHx of HTN, HLD, CA and s/p RLE Angio/SFA PTA/stent on 8/26/21 and now s/p LLE Angio/SFA stent on 9/28/21 returns for a f/u visit. Patient states that now pain in his legs is gone. \par LLE arterial US: Patent SFA stent, good flow.\par Patient was recommended to stay on Plavix and walk.\par F/u in 6 months or sooner if necessary.

## 2021-10-08 NOTE — REVIEW OF SYSTEMS
[Negative] : Heme/Lymph [Leg Claudication] : no intermittent leg claudication [Lower Ext Edema] : no extremity edema [Limb Pain] : no limb pain

## 2021-10-08 NOTE — ADDENDUM
[FreeTextEntry1] : I, Dr. Deann Arguello, personally performed the evaluation and management (E/M) services for this established patient who presents today with (an) existing condition(s).  That E/M includes conducting the examination, assessing all conditions, and (re)establishing/reinforcing a plan of care.  Today, my ACP, Roseann HERNANDEZ, was here to observe my evaluation and management services for this condition to be followed going forward.\par \par \par

## 2021-10-08 NOTE — PHYSICAL EXAM
[Normal Breath Sounds] : Normal breath sounds [Normal Heart Sounds] : normal heart sounds [Normal Rate and Rhythm] : normal rate and rhythm [1+] : left 1+ [Varicose Veins Of Lower Extremities] : bilaterally [Ankle Swelling On The Left] : moderate [Alert] : alert [Oriented to Person] : oriented to person [Oriented to Place] : oriented to place [Oriented to Time] : oriented to time [Calm] : calm [2+] : left 2+ [JVD] : no jugular venous distention  [de-identified] : Well appearing. NAD [de-identified] : NCAT [de-identified] : FROM

## 2021-10-08 NOTE — HISTORY OF PRESENT ILLNESS
[FreeTextEntry1] : 79 y/o diabetic M former smoker with PMHx of HTN, HLD, CA and s/p RLE Angio/SFA PTA/stent on 8/26/21 and now s/p LLE Angio/PTA on 9/28/21 returns for a f/u visit. He is happy with the outcome of L leg angiogram and now has no difficulty ambulating. Denies R groin pain, fever, chills\par

## 2021-12-23 LAB
SARS-COV-2 N GENE NPH QL NAA+PROBE: NOT DETECTED
SARS-COV-2 N GENE NPH QL NAA+PROBE: NOT DETECTED

## 2022-04-04 ENCOUNTER — APPOINTMENT (OUTPATIENT)
Dept: VASCULAR SURGERY | Facility: CLINIC | Age: 82
End: 2022-04-04
Payer: MEDICARE

## 2022-04-04 ENCOUNTER — APPOINTMENT (OUTPATIENT)
Dept: VASCULAR SURGERY | Facility: CLINIC | Age: 82
End: 2022-04-04

## 2022-04-04 VITALS
HEART RATE: 73 BPM | HEIGHT: 64 IN | DIASTOLIC BLOOD PRESSURE: 73 MMHG | WEIGHT: 170 LBS | BODY MASS INDEX: 29.02 KG/M2 | SYSTOLIC BLOOD PRESSURE: 169 MMHG

## 2022-04-04 PROCEDURE — 99213 OFFICE O/P EST LOW 20 MIN: CPT

## 2022-04-04 PROCEDURE — 93925 LOWER EXTREMITY STUDY: CPT

## 2022-04-07 NOTE — ADDENDUM
[FreeTextEntry1] : I, Dr. Malcolm Maddox, personally performed the evaluation and management (E/M) services for this established patient who presents today with (an) existing condition(s).  That E/M includes conducting the examination, assessing all conditions, and (re)establishing/reinforcing a plan of care.  Today, my ACP, Roseann HERNANDEZ, was here to observe my evaluation and management services for this condition to be followed going forward.\par \par \par

## 2022-04-07 NOTE — ASSESSMENT
[Arterial/Venous Disease] : arterial/venous disease [FreeTextEntry1] : 82 y/o diabetic M former smoker with PMHx of HTN, HLD, CA and s/p RLE Angio/SFA PTA/stent on 8/26/21 and now s/p LLE Angio/PTA on 9/28/21 returns for a f/u visit.\par BL LE arterial US: Patent bilateral SFA stents. \par Patient was recommended to stay active, c/w Plavix.\par F/u in 6 months or sooner if necessary.

## 2022-04-07 NOTE — PHYSICAL EXAM
[Normal Breath Sounds] : Normal breath sounds [Normal Heart Sounds] : normal heart sounds [Normal Rate and Rhythm] : normal rate and rhythm [2+] : left 2+ [1+] : left 1+ [Varicose Veins Of Lower Extremities] : bilaterally [Ankle Swelling On The Left] : moderate [Alert] : alert [Oriented to Person] : oriented to person [Oriented to Place] : oriented to place [Oriented to Time] : oriented to time [Calm] : calm [JVD] : no jugular venous distention  [de-identified] : Well appearing. NAD [de-identified] : NCAT [de-identified] : FROM

## 2022-04-07 NOTE — HISTORY OF PRESENT ILLNESS
[FreeTextEntry1] : 82 y/o diabetic M former smoker with PMHx of HTN, HLD, CA and s/p RLE Angio/SFA PTA/stent on 8/26/21 and now s/p LLE Angio/PTA on 9/28/21 returns for a f/u visit. He is doing well overall, no difficulty ambulating. Denies rest pain, fever, chills.\par

## 2022-09-23 ENCOUNTER — APPOINTMENT (OUTPATIENT)
Dept: VASCULAR SURGERY | Facility: CLINIC | Age: 82
End: 2022-09-23

## 2022-09-23 VITALS
BODY MASS INDEX: 29.02 KG/M2 | HEIGHT: 64 IN | DIASTOLIC BLOOD PRESSURE: 64 MMHG | WEIGHT: 170 LBS | SYSTOLIC BLOOD PRESSURE: 115 MMHG | HEART RATE: 58 BPM

## 2022-09-23 PROCEDURE — 93925 LOWER EXTREMITY STUDY: CPT

## 2022-09-23 PROCEDURE — 99213 OFFICE O/P EST LOW 20 MIN: CPT

## 2022-09-23 NOTE — REVIEW OF SYSTEMS
[Leg Claudication] : no intermittent leg claudication [Lower Ext Edema] : no extremity edema [Limb Pain] : no limb pain [Negative] : Heme/Lymph

## 2022-09-23 NOTE — HISTORY OF PRESENT ILLNESS
[FreeTextEntry1] : 80 y/o diabetic M former smoker with PMHx of HTN, HLD, CA and s/p RLE Angio/SFA PTA/stent on 8/26/21 and now s/p LLE Angio/PTA on 9/28/21 returns for a f/u visit. He is doing well overall, no difficulty ambulating. He is able to walk 10 blocks and then feels that his legs become heavy. Denies rest pain, fever, chills.\par

## 2022-09-23 NOTE — ASSESSMENT
[FreeTextEntry1] : 82 y/o diabetic M former smoker with PMHx of HTN, HLD, CA and s/p RLE Angio/SFA PTA/stent on 8/26/21 and now s/p LLE Angio/PTA on 9/28/21 returns for a f/u visit.\par Stable claudication, no rest pain, no skin changes.\par BL LE arterial US: Patent bilateral SFA stents. \par Patient was recommended to stay active, c/w Plavix.\par F/u in 6 months or sooner if necessary. [Arterial/Venous Disease] : arterial/venous disease

## 2022-09-23 NOTE — PHYSICAL EXAM
[JVD] : no jugular venous distention  [Normal Breath Sounds] : Normal breath sounds [Normal Heart Sounds] : normal heart sounds [Normal Rate and Rhythm] : normal rate and rhythm [2+] : left 2+ [1+] : left 1+ [Varicose Veins Of Lower Extremities] : bilaterally [Ankle Swelling On The Left] : moderate [Alert] : alert [Oriented to Person] : oriented to person [Oriented to Place] : oriented to place [Oriented to Time] : oriented to time [Calm] : calm [de-identified] : Well appearing. NAD [de-identified] : NCAT [de-identified] : FROM

## 2023-03-23 ENCOUNTER — APPOINTMENT (OUTPATIENT)
Dept: VASCULAR SURGERY | Facility: CLINIC | Age: 83
End: 2023-03-23
Payer: MEDICARE

## 2023-03-23 VITALS
DIASTOLIC BLOOD PRESSURE: 76 MMHG | SYSTOLIC BLOOD PRESSURE: 175 MMHG | HEART RATE: 67 BPM | HEIGHT: 64 IN | BODY MASS INDEX: 29.02 KG/M2 | WEIGHT: 170 LBS

## 2023-03-23 PROCEDURE — 93925 LOWER EXTREMITY STUDY: CPT

## 2023-03-23 PROCEDURE — 99213 OFFICE O/P EST LOW 20 MIN: CPT

## 2023-03-23 NOTE — PROCEDURE
[FreeTextEntry1] : BL LE arterial US: Patent bilateral SFA stents, single vessel run-off below knees bilaterally

## 2023-03-23 NOTE — ASSESSMENT
[Arterial/Venous Disease] : arterial/venous disease [FreeTextEntry1] : 81 y/o diabetic M former smoker with PMHx of HTN, HLD, CA and s/p RLE Angio/SFA PTA/stent on 8/26/21 and now s/p LLE Angio/PTA on 9/28/21 returns for a f/u visit.\par Stable claudication, no rest pain, no skin changes.\par BL LE arterial US: Patent bilateral SFA stents. \par Patient was recommended to stay active, c/w Plavix/ASA.\par F/u in 6 months or sooner if necessary.

## 2023-03-23 NOTE — PHYSICAL EXAM
[Normal Breath Sounds] : Normal breath sounds [Normal Heart Sounds] : normal heart sounds [Normal Rate and Rhythm] : normal rate and rhythm [2+] : left 2+ [1+] : left 1+ [Varicose Veins Of Lower Extremities] : bilaterally [Ankle Swelling On The Left] : moderate [Alert] : alert [Oriented to Person] : oriented to person [Oriented to Place] : oriented to place [Oriented to Time] : oriented to time [Calm] : calm [JVD] : no jugular venous distention  [de-identified] : Well appearing. NAD [de-identified] : NCAT [de-identified] : FROM

## 2023-03-23 NOTE — HISTORY OF PRESENT ILLNESS
[FreeTextEntry1] : 83 y/o diabetic M former smoker with PMHx of HTN, HLD, CA and s/p RLE Angio/SFA PTA/stent on 8/26/21 and s/p LLE Angio/PTA on 9/28/21 returns for a routine  f/u visit. He is doing well overall, no difficulty ambulating. He states that if he walks ~20 blocks, then he feels that his legs become heavy. Denies rest pain, skin breakdown. Patient recently had percutaneous heart valve procedure (TAVR?) since he has a history of rheumatic heart disease. \par

## 2023-09-06 NOTE — ASSESSMENT
[FreeTextEntry1] : 83 y/o diabetic M former smoker with PMHx of HTN, HLD, CA and s/p RLE Angio/SFA PTA/stent on 8/26/21 and now s/p LLE Angio/PTA on 9/28/21 returns for a f/u visit.\par  Stable claudication, no rest pain, no skin changes.\par  BL LE arterial US: Patent bilateral SFA stents. \par  Patient was recommended to stay active, c/w Plavix/ASA.\par  F/u in 6 months or sooner if necessary. [Arterial/Venous Disease] : arterial/venous disease

## 2023-09-06 NOTE — HISTORY OF PRESENT ILLNESS
[FreeTextEntry1] : 81 y/o diabetic M former smoker with PMHx of HTN, HLD, CA and s/p RLE Angio/SFA PTA/stent on 8/26/21 and s/p LLE Angio/PTA on 9/28/21 returns for a routine  f/u visit. He is doing well overall, no difficulty ambulating. He states that if he walks ~20 blocks, then he feels that his legs become heavy. Denies rest pain, skin breakdown. Patient recently had percutaneous heart valve procedure (TAVR?) since he has a history of rheumatic heart disease. \par

## 2023-09-06 NOTE — PHYSICAL EXAM
[JVD] : no jugular venous distention  [Normal Breath Sounds] : Normal breath sounds [Normal Heart Sounds] : normal heart sounds [Normal Rate and Rhythm] : normal rate and rhythm [2+] : left 2+ [1+] : left 1+ [Varicose Veins Of Lower Extremities] : bilaterally [Ankle Swelling On The Left] : moderate [Alert] : alert [Oriented to Person] : oriented to person [Oriented to Place] : oriented to place [Oriented to Time] : oriented to time [Calm] : calm [de-identified] : Well appearing. NAD [de-identified] : NCAT [de-identified] : FROM

## 2023-09-11 ENCOUNTER — APPOINTMENT (OUTPATIENT)
Dept: VASCULAR SURGERY | Facility: CLINIC | Age: 83
End: 2023-09-11
Payer: MEDICARE

## 2023-09-11 PROCEDURE — 99214 OFFICE O/P EST MOD 30 MIN: CPT

## 2023-09-11 PROCEDURE — 93925 LOWER EXTREMITY STUDY: CPT

## 2024-03-11 ENCOUNTER — APPOINTMENT (OUTPATIENT)
Dept: VASCULAR SURGERY | Facility: CLINIC | Age: 84
End: 2024-03-11
Payer: MEDICARE

## 2024-03-11 VITALS
HEIGHT: 64 IN | SYSTOLIC BLOOD PRESSURE: 161 MMHG | DIASTOLIC BLOOD PRESSURE: 62 MMHG | HEART RATE: 52 BPM | BODY MASS INDEX: 29.02 KG/M2 | WEIGHT: 170 LBS

## 2024-03-11 DIAGNOSIS — I73.9 PERIPHERAL VASCULAR DISEASE, UNSPECIFIED: ICD-10-CM

## 2024-03-11 PROCEDURE — 93925 LOWER EXTREMITY STUDY: CPT

## 2024-03-11 PROCEDURE — 99214 OFFICE O/P EST MOD 30 MIN: CPT

## 2024-03-12 PROBLEM — I73.9 PAD (PERIPHERAL ARTERY DISEASE): Status: ACTIVE | Noted: 2022-09-23

## 2024-03-13 NOTE — HISTORY OF PRESENT ILLNESS
[FreeTextEntry1] : 82yo diabetic M, former smoker with PMHx of HTN, HLD, CA and s/p RLE Angio/SFA PTA/stent on 8/26/21 and s/p LLE Angio/PTA on 9/28/21 returns for a routine f/u visit. He is doing well overall, no difficulty ambulating. He states that if he walks ~20 blocks, then he feels that his legs become heavy. Denies rest pain, skin breakdown. Patient is able to walk 6-7 blocks, denies rest pain, skin breakdown.

## 2024-03-13 NOTE — ASSESSMENT
[Arterial/Venous Disease] : arterial/venous disease [FreeTextEntry1] : 84yo diabetic M, former smoker with PMHx of HTN, HLD, CA and s/p RLE Angio/SFA PTA/stent on 8/26/21 and s/p LLE Angio/PTA on 9/28/21 returns for a routine f/u visit. He is doing well overall, no difficulty ambulating.  On exam, both legs are well perfused, no edema, no skin changes. BL LE arterial US: Patent bilateral SFA stents, single vessel run-off below knees bilaterally. We discussed the findings and explained that no vascular intervention is necessary at this time. He was recommended to stay active and to c/w his medications. F/u in 6 months for surveillance US or sooner if new symptoms.

## 2024-03-13 NOTE — PHYSICAL EXAM
[Normal Breath Sounds] : Normal breath sounds [Normal Heart Sounds] : normal heart sounds [Normal Rate and Rhythm] : normal rate and rhythm [2+] : left 2+ [1+] : left 1+ [Varicose Veins Of Lower Extremities] : present [Ankle Swelling On The Left] : moderate [Alert] : alert [Oriented to Place] : oriented to place [Oriented to Person] : oriented to person [Calm] : calm [Oriented to Time] : oriented to time [JVD] : no jugular venous distention  [de-identified] : Well appearing. NAD [de-identified] : FROM [de-identified] : NCAT [de-identified] : grossly intact

## 2024-03-13 NOTE — ADDENDUM
[FreeTextEntry1] : I, Dr. Pete Graves, personally performed the evaluation and management (E/M) services for this established patient who presents today with (an) existing condition(s).  That E/M includes conducting the examination, assessing all conditions, and (re)establishing/reinforcing a plan of care.  Today, my ACP, Roseann HERNANDEZ, was here to observe my evaluation and management services for this condition to be followed going forward.  I spent a total of 32 minutes in this encounter.

## 2024-07-09 ENCOUNTER — APPOINTMENT (OUTPATIENT)
Dept: ORTHOPEDIC SURGERY | Facility: CLINIC | Age: 84
End: 2024-07-09

## 2024-07-09 PROCEDURE — 99214 OFFICE O/P EST MOD 30 MIN: CPT

## 2024-07-09 PROCEDURE — 72110 X-RAY EXAM L-2 SPINE 4/>VWS: CPT

## 2024-07-09 RX ORDER — METHOCARBAMOL 500 MG/1
500 TABLET, FILM COATED ORAL 3 TIMES DAILY
Qty: 30 | Refills: 0 | Status: ACTIVE | COMMUNITY
Start: 2024-07-09 | End: 1900-01-01

## 2024-07-25 ENCOUNTER — APPOINTMENT (OUTPATIENT)
Dept: HEART AND VASCULAR | Facility: CLINIC | Age: 84
End: 2024-07-25
Payer: MEDICARE

## 2024-07-25 ENCOUNTER — APPOINTMENT (OUTPATIENT)
Dept: MRI IMAGING | Facility: HOSPITAL | Age: 84
End: 2024-07-25
Payer: MEDICARE

## 2024-07-25 ENCOUNTER — NON-APPOINTMENT (OUTPATIENT)
Age: 84
End: 2024-07-25

## 2024-07-25 VITALS
BODY MASS INDEX: 31.07 KG/M2 | HEART RATE: 54 BPM | DIASTOLIC BLOOD PRESSURE: 70 MMHG | TEMPERATURE: 96.6 F | WEIGHT: 182 LBS | HEIGHT: 64 IN | SYSTOLIC BLOOD PRESSURE: 168 MMHG | OXYGEN SATURATION: 95 %

## 2024-07-25 DIAGNOSIS — Z95.0 PRESENCE OF CARDIAC PACEMAKER: ICD-10-CM

## 2024-07-25 PROCEDURE — 72148 MRI LUMBAR SPINE W/O DYE: CPT | Mod: 26,MH

## 2024-07-25 PROCEDURE — 93280 PM DEVICE PROGR EVAL DUAL: CPT

## 2024-07-25 NOTE — PROCEDURE
[Complete Heart Block] : complete heart block [Pacemaker] : pacemaker [de-identified] : See paceart

## 2024-07-25 NOTE — PHYSICAL EXAM
[Normal Breath Sounds] : Normal breath sounds [Normal Heart Sounds] : normal heart sounds [Normal Rate and Rhythm] : normal rate and rhythm [2+] : left 2+ [1+] : left 1+ [Varicose Veins Of Lower Extremities] : bilaterally [Ankle Swelling On The Left] : moderate [Alert] : alert [Oriented to Person] : oriented to person [Oriented to Place] : oriented to place [Oriented to Time] : oriented to time [Calm] : calm [JVD] : no jugular venous distention  [de-identified] : Well appearing. NAD [de-identified] : NCAT [de-identified] : FROM [de-identified] : grossly intact [General Appearance - Well Developed] : well developed [Normal Appearance] : normal appearance [Well Groomed] : well groomed [General Appearance - Well Nourished] : well nourished [No Deformities] : no deformities [General Appearance - In No Acute Distress] : no acute distress [Left Infraclavicular] : left infraclavicular area [Clean] : clean [Dry] : dry [Well-Healed] : well-healed

## 2024-07-25 NOTE — REASON FOR VISIT
[Follow-up Device Check] : is here today for a follow-up device check visit for [Other ___] : [unfilled] [Follow-Up: _____] : a [unfilled] follow-up visit

## 2024-08-05 ENCOUNTER — APPOINTMENT (OUTPATIENT)
Dept: ORTHOPEDIC SURGERY | Facility: CLINIC | Age: 84
End: 2024-08-05

## 2024-08-05 PROCEDURE — 99214 OFFICE O/P EST MOD 30 MIN: CPT

## 2024-08-05 NOTE — DISCUSSION/SUMMARY
[de-identified] : Lumbar spinal stenosis, lower extremity claudication -referred for trial of JHON.  If not successful temporarily, would then have him see cardiologist to see if possible to d/c statin as this may be the cause.  Lumbar stenosis stable and more mild than usual for these types of cramping symptoms, though possible due to stenosis in spine -follow up in 6 weeks

## 2024-08-05 NOTE — PHYSICAL EXAM
[de-identified] : Physical Exam:  General: patient is well developed, well nourished, in no acute  distress, alert and oriented x 3.   Mood and affect: normal  Respiratory: no respiratory distress noted  Skin: no scars over spine, skin intact, no erythema, increased warmth  Alignment:The spine is well compensated in the coronal and sagittal plane.    Gait: The patient is able to toe walk and heel walk without difficulty. The patient is able to tandem gait without difficutly.  Palpation: no tenderness to palpation spine or paraspinal region  Range of motion: Lumbar spine ROM is restricted  Neurologic Exam: Motor: Manual Muscle testing in the lower extremities is 5 out of 5 in all muscle groups. There is no evidence of muscular atrophy in the lower extremities. Sensory: Sensation to light touch is grossly intact in the lower extremities  Reflexes: DTR are present and symmetric throughout, no clonus, plantar responses are flexor  Hip Exam: No pain with internal or external rotation of hips bilaterally  Special tests: Straight leg raise test negative.  Cross straight leg test negative.  ERIS test negative  Vascular: Examination of the peripheral vascular system demonstrates no evidence of congestion or edema. no evidence of lymphedema bilateral lower extremities, pulses are present and symmetric in both lower extremities. [de-identified] : MRI 7/2024 lumbar my read: L3/4 mild lateral recess stenosis bilaterally, no significant central stenosis; L4/5 mild central stenosis, mod to severe lateral recess stenosis bilaterally.  appears relatively stable from 2021 MRI.

## 2024-08-05 NOTE — HISTORY OF PRESENT ILLNESS
[de-identified] : Here for follow up.  had MRI. still with bilateral cramping in his lower extremities when walks.  feels that they feel weak when he starts walking.  is on statin.  had negative vascular workup.

## 2024-09-09 ENCOUNTER — APPOINTMENT (OUTPATIENT)
Dept: VASCULAR SURGERY | Facility: CLINIC | Age: 84
End: 2024-09-09
Payer: MEDICARE

## 2024-09-09 VITALS
BODY MASS INDEX: 31.07 KG/M2 | WEIGHT: 182 LBS | DIASTOLIC BLOOD PRESSURE: 66 MMHG | HEIGHT: 64 IN | HEART RATE: 52 BPM | SYSTOLIC BLOOD PRESSURE: 132 MMHG

## 2024-09-09 DIAGNOSIS — I73.9 PERIPHERAL VASCULAR DISEASE, UNSPECIFIED: ICD-10-CM

## 2024-09-09 PROCEDURE — 93925 LOWER EXTREMITY STUDY: CPT

## 2024-09-09 PROCEDURE — 99214 OFFICE O/P EST MOD 30 MIN: CPT

## 2024-09-11 NOTE — ASSESSMENT
[Arterial/Venous Disease] : arterial/venous disease [FreeTextEntry1] : 84yo diabetic M, former smoker with PMHx of HTN, HLD, CA and s/p RLE Angio/SFA PTA/stent on 8/26/21 and s/p LLE Angio/PTA on 9/28/21 returns for a routine f/u visit. He is doing well overall, no difficulty ambulating.  On exam, both legs are well perfused, no edema, no skin changes. BL LE arterial US: Patent bilateral SFA stents, single vessel run-off below knees bilaterally. We discussed the findings and explained that no vascular intervention is necessary at this time. He was recommended to stay active and to c/w his medications. F/u in 1 year for surveillance US or sooner if new symptoms.  I spent a total of 30 minutes in this encounter.

## 2024-09-11 NOTE — PHYSICAL EXAM
[Normal Breath Sounds] : Normal breath sounds [Normal Heart Sounds] : normal heart sounds [Normal Rate and Rhythm] : normal rate and rhythm [2+] : left 2+ [1+] : left 1+ [Varicose Veins Of Lower Extremities] : bilaterally [Ankle Swelling On The Left] : moderate [Alert] : alert [Oriented to Person] : oriented to person [Oriented to Place] : oriented to place [Oriented to Time] : oriented to time [Calm] : calm [JVD] : no jugular venous distention  [de-identified] : Well appearing. NAD [de-identified] : NCAT [de-identified] : FROM [de-identified] : grossly intact

## 2024-09-11 NOTE — HISTORY OF PRESENT ILLNESS
[FreeTextEntry1] : 82yo diabetic M, former smoker with PMHx of HTN, HLD, CA and s/p RLE Angio/SFA PTA/stent on 8/26/21 and s/p LLE Angio/PTA on 9/28/21 returns for a routine f/u visit. He is doing well overall, no difficulty ambulating. Denies rest pain, skin breakdown. Patient is able to walk ~5 blocks.

## 2024-09-11 NOTE — PHYSICAL EXAM
[Normal Breath Sounds] : Normal breath sounds [Normal Heart Sounds] : normal heart sounds [Normal Rate and Rhythm] : normal rate and rhythm [2+] : left 2+ [1+] : left 1+ [Varicose Veins Of Lower Extremities] : bilaterally [Ankle Swelling On The Left] : moderate [Alert] : alert [Oriented to Person] : oriented to person [Oriented to Place] : oriented to place [Oriented to Time] : oriented to time [Calm] : calm [JVD] : no jugular venous distention  [de-identified] : Well appearing. NAD [de-identified] : NCAT [de-identified] : FROM [de-identified] : grossly intact

## 2024-09-11 NOTE — PHYSICAL EXAM
[Normal Breath Sounds] : Normal breath sounds [Normal Heart Sounds] : normal heart sounds [Normal Rate and Rhythm] : normal rate and rhythm [2+] : left 2+ [1+] : left 1+ [Varicose Veins Of Lower Extremities] : bilaterally [Ankle Swelling On The Left] : moderate [Alert] : alert [Oriented to Person] : oriented to person [Oriented to Place] : oriented to place [Oriented to Time] : oriented to time [Calm] : calm [JVD] : no jugular venous distention  [de-identified] : Well appearing. NAD [de-identified] : NCAT [de-identified] : FROM [de-identified] : grossly intact

## 2024-09-11 NOTE — ASSESSMENT
[Arterial/Venous Disease] : arterial/venous disease [FreeTextEntry1] : 82yo diabetic M, former smoker with PMHx of HTN, HLD, CA and s/p RLE Angio/SFA PTA/stent on 8/26/21 and s/p LLE Angio/PTA on 9/28/21 returns for a routine f/u visit. He is doing well overall, no difficulty ambulating.  On exam, both legs are well perfused, no edema, no skin changes. BL LE arterial US: Patent bilateral SFA stents, single vessel run-off below knees bilaterally. We discussed the findings and explained that no vascular intervention is necessary at this time. He was recommended to stay active and to c/w his medications. F/u in 1 year for surveillance US or sooner if new symptoms.  I spent a total of 30 minutes in this encounter.

## 2024-09-11 NOTE — ADDENDUM
[FreeTextEntry1] : I, Dr. Pete Graves, personally performed the evaluation and management (E/M) services for this established patient who presents today with (an) existing condition(s).  That E/M includes conducting the examination, assessing all conditions, and (re)establishing/reinforcing a plan of care.  Today, my ACP, Roseann HERNANDEZ, was here to observe my evaluation and management services for this condition to be followed going forward.

## 2024-10-02 ENCOUNTER — APPOINTMENT (OUTPATIENT)
Dept: ORTHOPEDIC SURGERY | Facility: CLINIC | Age: 84
End: 2024-10-02
Payer: MEDICARE

## 2024-10-02 DIAGNOSIS — M79.604 PAIN IN RIGHT LEG: ICD-10-CM

## 2024-10-02 DIAGNOSIS — M48.061 SPINAL STENOSIS, LUMBAR REGION WITHOUT NEUROGENIC CLAUDICATION: ICD-10-CM

## 2024-10-02 DIAGNOSIS — M79.605 PAIN IN RIGHT LEG: ICD-10-CM

## 2024-10-02 PROCEDURE — 99213 OFFICE O/P EST LOW 20 MIN: CPT

## 2024-10-02 NOTE — DISCUSSION/SUMMARY
[de-identified] : Diagnosis: Lumbar spinal stenosis, lower extremity claudication  Discussed my findings with the patient. He reports relief of his low back pain after injection with Dr. Lock. Treatment options discussed. Will return to Dr. Lock for consideration of JHON in attempt to improve lower extremity pain. Follow up with me in 3 months, sooner if there is an issue. All questions answered.

## 2024-10-02 NOTE — PHYSICAL EXAM
[de-identified] : General: patient is well developed, well nourished, in no acute \par  distress, alert and oriented x 3. \par  \par  Mood and affect: normal\par  \par  Respiratory: no respiratory distress noted\par  \par  Skin: no scars over spine, skin intact, no erythema, increased warmth\par  \par  Alignment:The spine is well compensated in the coronal and sagittal plane.  \par  \par  Gait: The patient is able to toe walk and heel walk without difficulty.\par  \par  Palpation: no tenderness to palpation spine or paraspinal region\par  \par  Range of motion: Lumbar spine ROM is restricted\par  \par  Neurologic Exam:\par  Motor: Manual Muscle testing in the lower extremities is 5 out of 5 in all muscle groups. There is no evidence of muscular atrophy in the lower extremities. Sensory: Sensation to light touch is grossly intact in the lower extremities\par  \par  Reflexes: DTR are present and symmetric throughout\par  \par  Hip Exam: No pain with internal or external rotation of hips bilaterally\par  \par  Special tests: Straight leg raise test negative.  Cross straight leg test negative.  ERIS test negative\par  \par  \par   [de-identified] : MRI 7/2024 lumbar my read: L3/4 mild lateral recess stenosis bilaterally, no significant central stenosis; L4/5 mild central stenosis, mod to severe lateral recess stenosis bilaterally. appears relatively stable from 2021 MRI.

## 2024-10-02 NOTE — END OF VISIT
[FreeTextEntry3] :   This note was written by Carley Cardenas PA-C, acting as a scribe for Dr. Randy White. I, Dr. Randy White, have read and attest that all the information, medical decision-making, and discharge instructions within are true and accurate.  I, Dr. Randy White, personally performed the evaluation and management (E/M) services for this new patient. That E/M includes conducting the initial examination, assessing all conditions, and establishing the plan of care. Today, my ACP, Carley Cardenas PA-C, was here to observe my evaluation and management services for this patient to be followed going forward.

## 2024-10-02 NOTE — HISTORY OF PRESENT ILLNESS
[de-identified] : Follow up 10/2/24: Patient returns for follow up. Since last visit, he had an injection with Dr. Lock (not JHON) with significant relief of his low back pain. No change in lower extremity pain, which is interfering with ability to ambulate. No new neurologic symptoms.  Follow up 8/5/24: Here for follow up. had MRI. still with bilateral cramping in his lower extremities when walks. feels that they feel weak when he starts walking. is on statin. had negative vascular workup.

## 2024-11-06 ENCOUNTER — APPOINTMENT (OUTPATIENT)
Dept: ORTHOPEDIC SURGERY | Facility: CLINIC | Age: 84
End: 2024-11-06